# Patient Record
Sex: MALE | Race: BLACK OR AFRICAN AMERICAN | NOT HISPANIC OR LATINO
[De-identification: names, ages, dates, MRNs, and addresses within clinical notes are randomized per-mention and may not be internally consistent; named-entity substitution may affect disease eponyms.]

---

## 2017-07-06 PROBLEM — Z00.00 ENCOUNTER FOR PREVENTIVE HEALTH EXAMINATION: Status: ACTIVE | Noted: 2017-07-06

## 2017-07-13 ENCOUNTER — APPOINTMENT (OUTPATIENT)
Dept: ORTHOPEDIC SURGERY | Facility: CLINIC | Age: 52
End: 2017-07-13

## 2017-07-13 VITALS — WEIGHT: 160 LBS | HEIGHT: 67 IN | BODY MASS INDEX: 25.11 KG/M2

## 2017-07-13 DIAGNOSIS — Z60.2 PROBLEMS RELATED TO LIVING ALONE: ICD-10-CM

## 2017-07-13 DIAGNOSIS — Z78.9 OTHER SPECIFIED HEALTH STATUS: ICD-10-CM

## 2017-07-13 DIAGNOSIS — Z87.39 PERSONAL HISTORY OF OTHER DISEASES OF THE MUSCULOSKELETAL SYSTEM AND CONNECTIVE TISSUE: ICD-10-CM

## 2017-07-13 SDOH — SOCIAL STABILITY - SOCIAL INSECURITY: PROBLEMS RELATED TO LIVING ALONE: Z60.2

## 2017-07-17 PROBLEM — Z78.9 EXERCISES OCCASIONALLY: Status: ACTIVE | Noted: 2017-07-13

## 2017-07-17 PROBLEM — Z78.9 CURRENT NON-SMOKER: Status: ACTIVE | Noted: 2017-07-13

## 2017-07-17 PROBLEM — Z78.9 RARELY CONSUMES ALCOHOL: Status: ACTIVE | Noted: 2017-07-13

## 2017-07-17 PROBLEM — Z87.39 HISTORY OF HERNIATED INTERVERTEBRAL DISC: Status: RESOLVED | Noted: 2017-07-13 | Resolved: 2017-07-17

## 2017-07-18 ENCOUNTER — APPOINTMENT (OUTPATIENT)
Dept: ORTHOPEDIC SURGERY | Facility: CLINIC | Age: 52
End: 2017-07-18

## 2018-02-22 ENCOUNTER — APPOINTMENT (OUTPATIENT)
Dept: ORTHOPEDIC SURGERY | Facility: CLINIC | Age: 53
End: 2018-02-22
Payer: MEDICAID

## 2018-02-22 DIAGNOSIS — M89.9 DISORDER OF BONE, UNSPECIFIED: ICD-10-CM

## 2018-02-22 PROCEDURE — 99213 OFFICE O/P EST LOW 20 MIN: CPT

## 2018-08-23 ENCOUNTER — APPOINTMENT (OUTPATIENT)
Dept: ORTHOPEDIC SURGERY | Facility: CLINIC | Age: 53
End: 2018-08-23

## 2019-10-02 PROBLEM — Z60.2 PERSON LIVING ALONE: Status: ACTIVE | Noted: 2017-07-13

## 2022-03-08 ENCOUNTER — APPOINTMENT (OUTPATIENT)
Dept: RADIATION ONCOLOGY | Facility: CLINIC | Age: 57
End: 2022-03-08
Payer: OTHER GOVERNMENT

## 2022-03-08 PROCEDURE — 99443: CPT

## 2022-03-08 NOTE — HISTORY OF PRESENT ILLNESS
[FreeTextEntry1] : Mr. 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  He calls us today for telephonic consultation.\par \par He was in his usual state of health until, on routine screening, he was found to have an elevated PSA:\par 8/26/21- PSA 9.1 ng/mL\par \par 9/20/21- Prostate Biopsy- [facility] - Dr. Batista  (2/12 cores positive)\par E. Prostate, Mid, Left biopsy\par [place] 7(3+4), involving approx 50% of tissue\par \par J. Prostate [place], Right Biopsy\par [place] 6(3+3),  involving approx 5% of tissue\par \par 10/11/21- NM Bone Scan- \par No evidence of osteoblastic metastasis\par \par 12/10/21- MRI Prostate- ([facility]) \par 22cc Prostate Volume - PIRADS 4\par Lesion- Left Anterior Midgland Transition Zone  (9x8mm)\par No EPE, No SVI, No LAD, No aggressive osseous lesion\par -small left fat containing inguinal hernia\par \par 12/20/21- PSA- 11.9 ng/mL\par \par Today he presents for consideration of radiation therapy to the prostate.  He has had detailed consultations with Dr. Batista and Dr. Cuellar, and has reviewed [employer]-based stereotactic body radiation therapy.  He denies a previous history of radiation therapy.  He notes baseline good urinary function, with nocturia x0.  He denies known hemorrhoids, but notes he has had occult blood in the stool in the past; he is waiting for a virtual colonoscopy.  He notes moderate erectile function, with the use of sildenafil on a as needed basis.  He is unsure if he is interested in sperm banking at this juncture, and notes his significant other is of childbearing age and he will review with her.

## 2022-03-08 NOTE — DISEASE MANAGEMENT
[1] : T1 [c] : c [10-20] : 10 - 20 ng/mL [Biopsy] : Patient had a biopsy on [7(3+4)] : Template Biopsy [place] Score: 7(3+4) [] : Patient had a Prostate MRI [4] : 4 [IIB] : IIB [BiopsyDate] : 9/20/21 [TotalCores] : 12 [TotalPositiveCores] : 2 [BoneScanResults] : no mets

## 2022-03-18 ENCOUNTER — NON-APPOINTMENT (OUTPATIENT)
Age: 57
End: 2022-03-18